# Patient Record
Sex: MALE | Race: WHITE | NOT HISPANIC OR LATINO | Employment: FULL TIME | URBAN - METROPOLITAN AREA
[De-identification: names, ages, dates, MRNs, and addresses within clinical notes are randomized per-mention and may not be internally consistent; named-entity substitution may affect disease eponyms.]

---

## 2024-04-30 ENCOUNTER — HOSPITAL ENCOUNTER (EMERGENCY)
Facility: HOSPITAL | Age: 49
Discharge: HOME | End: 2024-04-30
Attending: STUDENT IN AN ORGANIZED HEALTH CARE EDUCATION/TRAINING PROGRAM
Payer: COMMERCIAL

## 2024-04-30 VITALS
DIASTOLIC BLOOD PRESSURE: 87 MMHG | WEIGHT: 226.2 LBS | HEIGHT: 67 IN | BODY MASS INDEX: 35.5 KG/M2 | TEMPERATURE: 97.9 F | HEART RATE: 93 BPM | RESPIRATION RATE: 18 BRPM | SYSTOLIC BLOOD PRESSURE: 121 MMHG | OXYGEN SATURATION: 95 %

## 2024-04-30 DIAGNOSIS — R21 SKIN RASH: Primary | ICD-10-CM

## 2024-04-30 LAB
ANION GAP SERPL CALC-SCNC: 13 MMOL/L
BASOPHILS # BLD AUTO: 0.02 X10*3/UL (ref 0–0.1)
BASOPHILS NFR BLD AUTO: 0.2 %
BUN SERPL-MCNC: 18 MG/DL (ref 8–25)
CALCIUM SERPL-MCNC: 9.4 MG/DL (ref 8.5–10.4)
CHLORIDE SERPL-SCNC: 100 MMOL/L (ref 97–107)
CO2 SERPL-SCNC: 22 MMOL/L (ref 24–31)
CREAT SERPL-MCNC: 1 MG/DL (ref 0.4–1.6)
EGFRCR SERPLBLD CKD-EPI 2021: >90 ML/MIN/1.73M*2
EOSINOPHIL # BLD AUTO: 0.08 X10*3/UL (ref 0–0.7)
EOSINOPHIL NFR BLD AUTO: 0.6 %
ERYTHROCYTE [DISTWIDTH] IN BLOOD BY AUTOMATED COUNT: 12.8 % (ref 11.5–14.5)
GLUCOSE SERPL-MCNC: 167 MG/DL (ref 65–99)
HCT VFR BLD AUTO: 41.7 % (ref 41–52)
HGB BLD-MCNC: 14.2 G/DL (ref 13.5–17.5)
IMM GRANULOCYTES # BLD AUTO: 0.07 X10*3/UL (ref 0–0.7)
IMM GRANULOCYTES NFR BLD AUTO: 0.5 % (ref 0–0.9)
LYMPHOCYTES # BLD AUTO: 2.64 X10*3/UL (ref 1.2–4.8)
LYMPHOCYTES NFR BLD AUTO: 20.4 %
MCH RBC QN AUTO: 28.2 PG (ref 26–34)
MCHC RBC AUTO-ENTMCNC: 34.1 G/DL (ref 32–36)
MCV RBC AUTO: 83 FL (ref 80–100)
MONOCYTES # BLD AUTO: 0.68 X10*3/UL (ref 0.1–1)
MONOCYTES NFR BLD AUTO: 5.3 %
NEUTROPHILS # BLD AUTO: 9.44 X10*3/UL (ref 1.2–7.7)
NEUTROPHILS NFR BLD AUTO: 73 %
NRBC BLD-RTO: 0 /100 WBCS (ref 0–0)
PLATELET # BLD AUTO: 269 X10*3/UL (ref 150–450)
POTASSIUM SERPL-SCNC: 3.9 MMOL/L (ref 3.4–5.1)
RBC # BLD AUTO: 5.04 X10*6/UL (ref 4.5–5.9)
SODIUM SERPL-SCNC: 135 MMOL/L (ref 133–145)
WBC # BLD AUTO: 12.9 X10*3/UL (ref 4.4–11.3)

## 2024-04-30 PROCEDURE — 36415 COLL VENOUS BLD VENIPUNCTURE: CPT | Performed by: STUDENT IN AN ORGANIZED HEALTH CARE EDUCATION/TRAINING PROGRAM

## 2024-04-30 PROCEDURE — 99284 EMERGENCY DEPT VISIT MOD MDM: CPT | Mod: 25

## 2024-04-30 PROCEDURE — 2500000004 HC RX 250 GENERAL PHARMACY W/ HCPCS (ALT 636 FOR OP/ED): Performed by: STUDENT IN AN ORGANIZED HEALTH CARE EDUCATION/TRAINING PROGRAM

## 2024-04-30 PROCEDURE — 96360 HYDRATION IV INFUSION INIT: CPT

## 2024-04-30 PROCEDURE — 85025 COMPLETE CBC W/AUTO DIFF WBC: CPT | Performed by: STUDENT IN AN ORGANIZED HEALTH CARE EDUCATION/TRAINING PROGRAM

## 2024-04-30 PROCEDURE — 80048 BASIC METABOLIC PNL TOTAL CA: CPT | Performed by: STUDENT IN AN ORGANIZED HEALTH CARE EDUCATION/TRAINING PROGRAM

## 2024-04-30 RX ORDER — ESCITALOPRAM OXALATE 10 MG/1
5 TABLET ORAL
COMMUNITY
Start: 2024-04-09

## 2024-04-30 RX ORDER — EPINEPHRINE 0.3 MG/.3ML
1 INJECTION SUBCUTANEOUS ONCE AS NEEDED
Qty: 1 EACH | Refills: 0 | Status: SHIPPED | OUTPATIENT
Start: 2024-04-30

## 2024-04-30 RX ORDER — FAMOTIDINE 10 MG/ML
20 INJECTION INTRAVENOUS ONCE
Status: COMPLETED | OUTPATIENT
Start: 2024-04-30 | End: 2024-04-30

## 2024-04-30 RX ORDER — LISINOPRIL 10 MG/1
10 TABLET ORAL
COMMUNITY
Start: 2024-01-02

## 2024-04-30 RX ADMIN — SODIUM CHLORIDE 1000 ML: 9 INJECTION, SOLUTION INTRAVENOUS at 21:15

## 2024-04-30 RX ADMIN — FAMOTIDINE 20 MG: 10 INJECTION, SOLUTION INTRAVENOUS at 21:17

## 2024-04-30 ASSESSMENT — PAIN SCALES - GENERAL
PAINLEVEL_OUTOF10: 0 - NO PAIN
PAINLEVEL_OUTOF10: 0 - NO PAIN

## 2024-04-30 ASSESSMENT — PAIN - FUNCTIONAL ASSESSMENT: PAIN_FUNCTIONAL_ASSESSMENT: 0-10

## 2024-04-30 ASSESSMENT — COLUMBIA-SUICIDE SEVERITY RATING SCALE - C-SSRS
2. HAVE YOU ACTUALLY HAD ANY THOUGHTS OF KILLING YOURSELF?: NO
1. IN THE PAST MONTH, HAVE YOU WISHED YOU WERE DEAD OR WISHED YOU COULD GO TO SLEEP AND NOT WAKE UP?: NO
6. HAVE YOU EVER DONE ANYTHING, STARTED TO DO ANYTHING, OR PREPARED TO DO ANYTHING TO END YOUR LIFE?: NO

## 2024-05-01 NOTE — ED PROVIDER NOTES
HPI   Chief Complaint   Patient presents with    Hives     Patient BIB EMS after being found face down at [facility]'s with his nose and mouth swollen. Patient complains of hives all over his body that have been there for about a month and had gone away this weekend but returned today around 4:30pm. He states he is allergic to clams and pollen but did not eat any clams today. EMS administered 50mg benadryl, which helped reduce the swelling. He denies any difficulty breathing or chest pain.       HPI  See [facility]                  No data recorded                   Patient History   Past Medical History:   Diagnosis Date    Hypertension     controlled by medication    Other specified health status     Known health problems: none     Past Surgical History:   Procedure Laterality Date    KNEE SURGERY  09/21/2015    Knee Surgery    SHOULDER SURGERY  09/21/2015    Shoulder Surgery     No family history on file.  Social History     Tobacco Use    Smoking status: Never    Smokeless tobacco: Never   Substance Use Topics    Alcohol use: Not on file    Drug use: Not on file       Physical Exam   ED Triage Vitals [04/30/24 2027]   Temperature Heart Rate Respirations BP   36.6 °C (97.9 °F) (!) 110 18 121/87      Pulse Ox Temp Source Heart Rate Source Patient Position   96 % Temporal Monitor --      BP Location FiO2 (%)     -- --       Physical Exam  See [facility]  ED Course & MDM   Diagnoses as of 04/30/24 2205   Skin rash       Medical Decision Making  48-year-old male with past medical history of hypertension on lisinopril who presents emergency room with suspected allergic reaction.  Patient complains of hives and notes that he has been having on and off hives for the past month.  Patient otherwise denies any new detergents, lotions, medications, or known exposures and otherwise denies any allergies besides bees and seasonal allergies.  Patient believes it is stress related.  This evening patient felt as though his face was swelling and  felt lightheaded and presents to the emergency room for evaluation.  Patient at this time denies any fevers, chills, nausea, vomiting, chest pain, shortness of breath, diarrhea, constipation and otherwise was administered 50 mg of IV Benadryl per EMS.  Patient's father and family member are at bedside and notes that his face looks normal at this time and otherwise his symptoms are improving.    ED Triage Vitals [04/30/24 2027]   Temperature Heart Rate Respirations BP   36.6 °C (97.9 °F) (!) 110 18 121/87      Pulse Ox Temp Source Heart Rate Source Patient Position   96 % Temporal Monitor --      BP Location FiO2 (%)     -- --       Vital signs reviewed: Afebrile, mildly tachycardic, normotensive, 96% on room air    Exam     Constitutional: No acute distress. Resting comfortably.   Head: Normocephalic, atraumatic.   Eyes: Pupils equal bilaterally, EOM grossly intact, conjunctiva normal.  Mouth/Throat: Oropharynx is clear, moist mucus membranes.  No tongue swelling, no lip swelling, no oral involvement.  Neck: Supple. No lymphadenopathy.  Cardiovascular: Regular rate and regular rhythm. Extremities are well-perfused.   Pulmonary/Chest: No respiratory distress, breathing comfortably on room air.    Abdominal: Soft, non-tender, non-distended. No rebound or guarding.   Musculoskeletal: No lower extremity edema.       Skin: Warm, dry, and intact.  Rash noted to bilateral forearms, back as well as anterior chest with scattered rash over bilateral dorsal feet.  Neurological: Patient is oriented to person, place, time, and situation. Face symmetric, hearing intact to voice, speech normal. Moves all extremities.     Differential includes but is not limited to:  Skin rash, allergic reaction, contact dermatitis, poison ivy, drug rash, erythema   multiforme, hives, urticaria, soft tissue infection, abscess, cellulitis, necrotizing   fasciitis, impetigo, MRSA, insect bite, scabies or bedbugs, systemic infection,   chickenpox,  herpes zoster, disseminated gonococcemia, Lyme disease,   meningococcemia, viral exanthem.      Labs: ordered.  Labs Reviewed   CBC WITH AUTO DIFFERENTIAL - Abnormal       Result Value    WBC 12.9 (*)     nRBC 0.0      RBC 5.04      Hemoglobin 14.2      Hematocrit 41.7      MCV 83      MCH 28.2      MCHC 34.1      RDW 12.8      Platelets 269      Neutrophils % 73.0      Immature Granulocytes %, Automated 0.5      Lymphocytes % 20.4      Monocytes % 5.3      Eosinophils % 0.6      Basophils % 0.2      Neutrophils Absolute 9.44 (*)     Immature Granulocytes Absolute, Automated 0.07      Lymphocytes Absolute 2.64      Monocytes Absolute 0.68      Eosinophils Absolute 0.08      Basophils Absolute 0.02     BASIC METABOLIC PANEL - Abnormal    Glucose 167 (*)     Sodium 135      Potassium 3.9      Chloride 100      Bicarbonate 22 (*)     Urea Nitrogen 18      Creatinine 1.00      eGFR >90      Calcium 9.4      Anion Gap 13         Radiology: Considered but not indicated    ECG/medicine tests: ordered and independent interpretation performed.  Diagnoses as of 04/30/24 2205   Skin rash   Patient is well-appearing at this time with no respiratory distress or multiple organ system involvement and no suspicion at this time for anaphylaxis.  Patient otherwise without mucous membrane involvement and otherwise noting improvement of his symptoms.  Patient is mildly tachycardic on monitor at this time and will obtain basic blood work and fluid resuscitate and administer H2 blockers given patient already has had Benadryl.  No indication at this time for epinephrine.    Patient with improvement of after fluid administration and lab work as interpreted by me shows no significant AVELINO or electrolyte abnormality.  CBC does show mild leukocytosis but no anemia at this time.    PLAN AND FOLLOW-UP: Patient counseled on all findings, diagnosis and treatment plan. Patient's questions and concerns addressed. Patient stable, discharged with  instructions to follow up with PMD, and to return to ED at any time for worsening symptoms or any other concerns. Patient demonstrates understanding of the findings and the importance of appropriate follow up care.    ED Medications managed:    Medications   sodium chloride 0.9 % bolus 1,000 mL (1,000 mL intravenous New Bag 4/30/24 2115)   famotidine PF (Pepcid) injection 20 mg (20 mg intravenous Given 4/30/24 2117)         PATIENT REFERRED TO:  No follow-up provider specified.    DISCHARGE MEDICATIONS:  New Prescriptions    EPINEPHRINE (EPIPEN) 0.3 MG/0.3 ML INJECTION SYRINGE    Inject 0.3 mL (0.3 mg) into the muscle 1 time if needed for anaphylaxis for up to 1 dose. Inject into upper leg. Call 911 after use.       Dante Lauren MD  10:05 PM    Attending Emergency Physician  [facility] [place]            Procedure  Procedures     Dante Lauren MD  04/30/24 [id number]